# Patient Record
Sex: FEMALE | Race: BLACK OR AFRICAN AMERICAN | Employment: FULL TIME | URBAN - METROPOLITAN AREA
[De-identification: names, ages, dates, MRNs, and addresses within clinical notes are randomized per-mention and may not be internally consistent; named-entity substitution may affect disease eponyms.]

---

## 2017-06-23 ENCOUNTER — OFFICE VISIT (OUTPATIENT)
Dept: FAMILY MEDICINE CLINIC | Facility: CLINIC | Age: 27
End: 2017-06-23

## 2017-06-23 VITALS
HEART RATE: 81 BPM | HEIGHT: 66 IN | RESPIRATION RATE: 18 BRPM | DIASTOLIC BLOOD PRESSURE: 70 MMHG | BODY MASS INDEX: 36.96 KG/M2 | OXYGEN SATURATION: 99 % | TEMPERATURE: 98 F | SYSTOLIC BLOOD PRESSURE: 110 MMHG | WEIGHT: 230 LBS

## 2017-06-23 DIAGNOSIS — R07.89 CHEST TIGHTNESS: Primary | ICD-10-CM

## 2017-06-23 PROCEDURE — 94640 AIRWAY INHALATION TREATMENT: CPT | Performed by: PHYSICIAN ASSISTANT

## 2017-06-23 PROCEDURE — 99202 OFFICE O/P NEW SF 15 MIN: CPT | Performed by: PHYSICIAN ASSISTANT

## 2017-06-23 RX ORDER — ALBUTEROL SULFATE 90 UG/1
2 AEROSOL, METERED RESPIRATORY (INHALATION) EVERY 4 HOURS PRN
Qty: 1 INHALER | Refills: 0 | Status: SHIPPED | OUTPATIENT
Start: 2017-06-23

## 2017-06-23 RX ORDER — IPRATROPIUM BROMIDE AND ALBUTEROL SULFATE 2.5; .5 MG/3ML; MG/3ML
3 SOLUTION RESPIRATORY (INHALATION) ONCE
Status: COMPLETED | OUTPATIENT
Start: 2017-06-23 | End: 2017-06-23

## 2017-06-23 RX ADMIN — IPRATROPIUM BROMIDE AND ALBUTEROL SULFATE 3 ML: 2.5; .5 SOLUTION RESPIRATORY (INHALATION) at 13:04:00

## 2017-06-23 NOTE — PATIENT INSTRUCTIONS
I will call  You with chest x-ray results  Use albuterol inhaler as needed  If symptoms do not improve within 24 hours you will need  To go to the ER for further evaulation    Shortness of Breath (Dyspnea)  Shortness of breath is the feeling that you can't Follow up with your healthcare provider, or as advised. If tests were done, you will be told if your treatment needs to be changed. You can call as directed for the results. (Note: If an X-ray was taken, a specialist will review it.  You will be notified

## 2017-06-23 NOTE — PROGRESS NOTES
CHIEF COMPLAINT:   Patient presents with:  Breathing Problem: this morning, dx with PNA last year. Headache this morning, getting better. No cough.  No ST,     HPI:   Carmen Bautista is a 27year old female who presents with chest tightness and difficulty HENT: Atraumatic, normocephalic. TM's clear bilaterally. Nostrils patent, nasal mucosa pink and non-inflamed. No erythema of the throat. NECK: supple, non-tender. LUNGS: Increased respiratory rate. Normal effort. No cough. No wheezing.  No rales, crac Sig: Inhale 2 puffs into the lungs every 4 (four) hours as needed for Wheezing.    Dispense:  1 Inhaler   Refill:  0    Patient Instructions   I will call  You with chest x-ray results  Use albuterol inhaler as needed  If symptoms do not improve within 24 · Get regular exercise. Talk with your healthcare provider before starting to exercise, especially if you have other medical problems. · Cut down on the amount of caffeine and stimulants you consume.   Follow-up care  Follow up with your healthcare provide

## 2019-07-25 ENCOUNTER — HOSPITAL ENCOUNTER (EMERGENCY)
Facility: HOSPITAL | Age: 29
Discharge: HOME | End: 2019-07-25
Attending: EMERGENCY MEDICINE
Payer: COMMERCIAL

## 2019-07-25 ENCOUNTER — APPOINTMENT (OUTPATIENT)
Dept: GENERAL RADIOLOGY | Facility: HOSPITAL | Age: 29
End: 2019-07-25
Attending: EMERGENCY MEDICINE
Payer: COMMERCIAL

## 2019-07-25 ENCOUNTER — APPOINTMENT (OUTPATIENT)
Dept: CT IMAGING | Facility: HOSPITAL | Age: 29
End: 2019-07-25
Attending: EMERGENCY MEDICINE
Payer: COMMERCIAL

## 2019-07-25 VITALS
OXYGEN SATURATION: 99 % | HEIGHT: 66 IN | WEIGHT: 225 LBS | BODY MASS INDEX: 36.16 KG/M2 | SYSTOLIC BLOOD PRESSURE: 102 MMHG | HEART RATE: 72 BPM | DIASTOLIC BLOOD PRESSURE: 63 MMHG | RESPIRATION RATE: 17 BRPM | TEMPERATURE: 98 F

## 2019-07-25 DIAGNOSIS — R06.00 DYSPNEA, UNSPECIFIED TYPE: Primary | ICD-10-CM

## 2019-07-25 DIAGNOSIS — R07.9 CHEST PAIN OF UNCERTAIN ETIOLOGY: ICD-10-CM

## 2019-07-25 DIAGNOSIS — G47.30 SLEEP APNEA, UNSPECIFIED TYPE: ICD-10-CM

## 2019-07-25 LAB
ANION GAP SERPL CALC-SCNC: 6 MMOL/L (ref 0–18)
B-HCG UR QL: NEGATIVE
BASOPHILS # BLD AUTO: 0.03 X10(3) UL (ref 0–0.2)
BASOPHILS NFR BLD AUTO: 0.4 %
BUN BLD-MCNC: 14 MG/DL (ref 7–18)
BUN/CREAT SERPL: 16.9 (ref 10–20)
CALCIUM BLD-MCNC: 8.7 MG/DL (ref 8.5–10.1)
CHLORIDE SERPL-SCNC: 108 MMOL/L (ref 98–112)
CO2 SERPL-SCNC: 26 MMOL/L (ref 21–32)
CREAT BLD-MCNC: 0.83 MG/DL (ref 0.55–1.02)
D DIMER PPP FEU-MCNC: 0.97 UG/ML FEU (ref ?–0.5)
DEPRECATED RDW RBC AUTO: 46.9 FL (ref 35.1–46.3)
EOSINOPHIL # BLD AUTO: 0.12 X10(3) UL (ref 0–0.7)
EOSINOPHIL NFR BLD AUTO: 1.4 %
ERYTHROCYTE [DISTWIDTH] IN BLOOD BY AUTOMATED COUNT: 15.7 % (ref 11–15)
GLUCOSE BLD-MCNC: 82 MG/DL (ref 70–99)
HCT VFR BLD AUTO: 38.7 % (ref 35–48)
HGB BLD-MCNC: 11.9 G/DL (ref 12–16)
IMM GRANULOCYTES # BLD AUTO: 0.02 X10(3) UL (ref 0–1)
IMM GRANULOCYTES NFR BLD: 0.2 %
LYMPHOCYTES # BLD AUTO: 2.8 X10(3) UL (ref 1–4)
LYMPHOCYTES NFR BLD AUTO: 33.6 %
MCH RBC QN AUTO: 25.3 PG (ref 26–34)
MCHC RBC AUTO-ENTMCNC: 30.7 G/DL (ref 31–37)
MCV RBC AUTO: 82.2 FL (ref 80–100)
MONOCYTES # BLD AUTO: 0.57 X10(3) UL (ref 0.1–1)
MONOCYTES NFR BLD AUTO: 6.8 %
NEUTROPHILS # BLD AUTO: 4.79 X10 (3) UL (ref 1.5–7.7)
NEUTROPHILS # BLD AUTO: 4.79 X10(3) UL (ref 1.5–7.7)
NEUTROPHILS NFR BLD AUTO: 57.6 %
OSMOLALITY SERPL CALC.SUM OF ELEC: 290 MOSM/KG (ref 275–295)
PLATELET # BLD AUTO: 309 10(3)UL (ref 150–450)
RBC # BLD AUTO: 4.71 X10(6)UL (ref 3.8–5.3)
SODIUM SERPL-SCNC: 140 MMOL/L (ref 136–145)
TROPONIN I SERPL-MCNC: <0.045 NG/ML (ref ?–0.04)
WBC # BLD AUTO: 8.3 X10(3) UL (ref 4–11)

## 2019-07-25 PROCEDURE — 93005 ELECTROCARDIOGRAM TRACING: CPT

## 2019-07-25 PROCEDURE — 85025 COMPLETE CBC W/AUTO DIFF WBC: CPT | Performed by: EMERGENCY MEDICINE

## 2019-07-25 PROCEDURE — 93010 ELECTROCARDIOGRAM REPORT: CPT | Performed by: EMERGENCY MEDICINE

## 2019-07-25 PROCEDURE — 71260 CT THORAX DX C+: CPT | Performed by: EMERGENCY MEDICINE

## 2019-07-25 PROCEDURE — 36415 COLL VENOUS BLD VENIPUNCTURE: CPT

## 2019-07-25 PROCEDURE — 94640 AIRWAY INHALATION TREATMENT: CPT

## 2019-07-25 PROCEDURE — 71045 X-RAY EXAM CHEST 1 VIEW: CPT | Performed by: EMERGENCY MEDICINE

## 2019-07-25 PROCEDURE — 85379 FIBRIN DEGRADATION QUANT: CPT | Performed by: EMERGENCY MEDICINE

## 2019-07-25 PROCEDURE — 80048 BASIC METABOLIC PNL TOTAL CA: CPT | Performed by: EMERGENCY MEDICINE

## 2019-07-25 PROCEDURE — 99284 EMERGENCY DEPT VISIT MOD MDM: CPT

## 2019-07-25 PROCEDURE — 81025 URINE PREGNANCY TEST: CPT

## 2019-07-25 PROCEDURE — 84484 ASSAY OF TROPONIN QUANT: CPT | Performed by: EMERGENCY MEDICINE

## 2019-07-25 RX ORDER — IPRATROPIUM BROMIDE AND ALBUTEROL SULFATE 2.5; .5 MG/3ML; MG/3ML
3 SOLUTION RESPIRATORY (INHALATION) ONCE
Status: COMPLETED | OUTPATIENT
Start: 2019-07-25 | End: 2019-07-25

## 2019-07-25 NOTE — ED NOTES
Patient cleared for discharge by MD. Avendanos with patient. Patient discharge instructions reviewed with patient including when and how to follow up  with healthcare provider and when to seek medical treatment. Peripheral IV removed.

## 2019-07-25 NOTE — ED INITIAL ASSESSMENT (HPI)
Pt c/o waking up out of sleep at 0300 this morning with chest pain. +SOB. Denies cough or fever.  States she has a hx of sleep apnea and well as \"restrictive disease\", normally seen and treated out of [facility]

## 2019-07-26 NOTE — ED PROVIDER NOTES
Patient Seen in: [facility] AND [facility] Emergency Department    History   Patient presents with:  Chest Pain Angina (cardiovascular)    Stated Complaint:     HPI    Patient complains of shortness of breath that began this morning.   Reports she felt like she w EXTREMITIES: from, 5/5 strength in all 4 ext,no calf tenderness  NEURO: alert and oiented *3, 2-12 intact, no focal deficit noted  SKIN: good skin turgor, no  rashes  PSYCH: calm, cooperative,    Differential includes: pneumonia vs. CHF vs. bronchitis vs Dictated by (CST): Robina Munoz MD on 7/25/2019 at 12:46     Approved by (CST): Robina Munoz MD on 7/25/2019 at 12:53                Critical Care:        MDM                 Disposition and Plan     Clinical Impression:  Dyspnea, unspecified type  (

## 2021-01-21 ENCOUNTER — APPOINTMENT (OUTPATIENT)
Dept: MRI IMAGING | Facility: HOSPITAL | Age: 31
End: 2021-01-21
Attending: EMERGENCY MEDICINE
Payer: MEDICAID

## 2021-01-21 ENCOUNTER — HOSPITAL ENCOUNTER (EMERGENCY)
Facility: HOSPITAL | Age: 31
Discharge: HOME | End: 2021-01-21
Attending: EMERGENCY MEDICINE
Payer: MEDICAID

## 2021-01-21 VITALS
TEMPERATURE: 98 F | DIASTOLIC BLOOD PRESSURE: 67 MMHG | WEIGHT: 224.88 LBS | OXYGEN SATURATION: 99 % | RESPIRATION RATE: 18 BRPM | SYSTOLIC BLOOD PRESSURE: 113 MMHG | BODY MASS INDEX: 36 KG/M2 | HEART RATE: 89 BPM

## 2021-01-21 DIAGNOSIS — H53.8 BLURRY VISION, RIGHT EYE: Primary | ICD-10-CM

## 2021-01-21 LAB — B-HCG UR QL: NEGATIVE

## 2021-01-21 PROCEDURE — 99284 EMERGENCY DEPT VISIT MOD MDM: CPT

## 2021-01-21 PROCEDURE — 70551 MRI BRAIN STEM W/O DYE: CPT | Performed by: EMERGENCY MEDICINE

## 2021-01-21 PROCEDURE — 81025 URINE PREGNANCY TEST: CPT

## 2021-01-21 NOTE — ED INITIAL ASSESSMENT (HPI)
Pt presents reporting she lost vision to right eye at approx 0200 this morning for about 20 minutes. Pt states vision has returned now. Denies HA, dizziness, weakness.     Pt reports hx allergies and swelling to right eye several days ago

## 2021-01-22 NOTE — ED PROVIDER NOTES
Patient Seen in: [facility] AND [facility] Emergency Department      History   No chief complaint on file.     Stated Complaint: Eye/Visual Problems     HPI/Subjective:   HPI    Patient is a 25-year-old female who presents with right eye visual loss that occurred No acute distress, awake and alert  HEENT: MMM, EOMI, PERRL, visual acuity noted.  Conjunctiva normal  Neck: supple, non tender, no meningeal signs  Extremities: FROM of all extremities, no cyanosis/clubbing/edema  Neuro: CN intact, normal speech, normal ga

## 2021-01-22 NOTE — ED NOTES
Pt A&OX4, pt denies dizziness, lightheadedness, n/v. Discharge paperwork and follow-up discussed with pt, pt verbally understands them. Pt discharged ambulatory with steady gait - no distress noted.

## 2021-01-22 NOTE — ED PROVIDER NOTES
Signout taken with disposition likely discharge pending MRI, seen nonacute as below - stable for discharge as previously anticipated.     Mri Brain Wo Acute (3) Sequence (cpt=70551)    Result Date: 1/21/2021  PROCEDURE: MRI BRAIN WO ACUTE (3) SEQUENCE(CPT=7 process in the diagnostically imaged region by noncontrast MR technique. 2. Lesser incidental findings as above.     Dictated by (CST): Nina Friedman MD on 1/21/2021 at 7:34 PM     Finalized by (CST): Nina Friedamn MD on 1/21/2021 at 7:36 PM

## 2021-01-30 ENCOUNTER — HOSPITAL ENCOUNTER (EMERGENCY)
Facility: HOSPITAL | Age: 31
Discharge: HOME | End: 2021-01-30
Attending: EMERGENCY MEDICINE
Payer: MEDICAID

## 2021-01-30 VITALS
RESPIRATION RATE: 17 BRPM | SYSTOLIC BLOOD PRESSURE: 107 MMHG | DIASTOLIC BLOOD PRESSURE: 73 MMHG | TEMPERATURE: 98 F | HEIGHT: 66 IN | HEART RATE: 74 BPM | BODY MASS INDEX: 37.12 KG/M2 | OXYGEN SATURATION: 100 % | WEIGHT: 231 LBS

## 2021-01-30 DIAGNOSIS — M54.12 CERVICAL RADICULOPATHY: ICD-10-CM

## 2021-01-30 DIAGNOSIS — M54.40 BACK PAIN OF LUMBAR REGION WITH SCIATICA: ICD-10-CM

## 2021-01-30 DIAGNOSIS — G43.001 MIGRAINE WITHOUT AURA AND WITH STATUS MIGRAINOSUS, NOT INTRACTABLE: Primary | ICD-10-CM

## 2021-01-30 LAB — B-HCG UR QL: NEGATIVE

## 2021-01-30 PROCEDURE — 96375 TX/PRO/DX INJ NEW DRUG ADDON: CPT

## 2021-01-30 PROCEDURE — 99284 EMERGENCY DEPT VISIT MOD MDM: CPT

## 2021-01-30 PROCEDURE — 96361 HYDRATE IV INFUSION ADD-ON: CPT

## 2021-01-30 PROCEDURE — 81025 URINE PREGNANCY TEST: CPT

## 2021-01-30 PROCEDURE — 96374 THER/PROPH/DIAG INJ IV PUSH: CPT

## 2021-01-30 RX ORDER — DIPHENHYDRAMINE HYDROCHLORIDE 50 MG/ML
25 INJECTION INTRAMUSCULAR; INTRAVENOUS ONCE
Status: COMPLETED | OUTPATIENT
Start: 2021-01-30 | End: 2021-01-30

## 2021-01-30 RX ORDER — METHYLPREDNISOLONE 4 MG/1
TABLET ORAL
Qty: 1 PACKAGE | Refills: 0 | Status: SHIPPED | OUTPATIENT
Start: 2021-01-30

## 2021-01-30 RX ORDER — METOCLOPRAMIDE HYDROCHLORIDE 5 MG/ML
5 INJECTION INTRAMUSCULAR; INTRAVENOUS ONCE
Status: COMPLETED | OUTPATIENT
Start: 2021-01-30 | End: 2021-01-30

## 2021-01-30 RX ORDER — DEXAMETHASONE SODIUM PHOSPHATE 10 MG/ML
10 INJECTION, SOLUTION INTRAMUSCULAR; INTRAVENOUS ONCE
Status: COMPLETED | OUTPATIENT
Start: 2021-01-30 | End: 2021-01-30

## 2021-01-30 NOTE — ED PROVIDER NOTES
Patient Seen in: [facility] AND [facility] Emergency Department      History   Patient presents with:  Headache  Pain    Stated Complaint: HA, R eye pain, R hand goes numb, pain in back and legs- right side     HPI/Subjective:   HPI    The patient is a 30-year-o (36.7 °C)   Temp src Oral   SpO2 100 %   O2 Device None (Room air)       Current:/73   Pulse 74   Temp 98 °F (36.7 °C) (Oral)   Resp 17   Ht 167.6 cm (5' 6\")   Wt 104.8 kg   LMP 01/10/2021   SpO2 100%   BMI 37.28 kg/m²         Physical Exam  Vitals Behavior: Behavior normal.         Judgment: Judgment normal.       Differential diagnosis includes migraine, sciatica, cervical radiculopathy, intracranial abnormality, subarachnoid        ED Course     Labs Reviewed   [facility] POCT PREGNANCY URINE - Normal

## 2021-01-31 NOTE — ED NOTES
Radha Hue stated IVF is burning her hand. No signs of infiltration noted. No swelling noted around IV site. She states she would like IVF stopped. Fluids disconnected.

## 2021-01-31 NOTE — ED NOTES
At time of discharge, Marilu Bello is alert, oriented, speech clear, respirations regular and nonlabored. She is able to dress himself and ambulates unassisted with steady gait.  She verbalizes understanding of discharge instructions including prescribed medica

## 2021-02-02 ENCOUNTER — TELEPHONE (OUTPATIENT)
Dept: NEUROLOGY | Facility: CLINIC | Age: 31
End: 2021-02-02

## 2021-03-12 DIAGNOSIS — Z23 NEED FOR VACCINATION: ICD-10-CM

## 2021-03-16 ENCOUNTER — IMMUNIZATION (OUTPATIENT)
Dept: LAB | Age: 31
End: 2021-03-16
Attending: HOSPITALIST
Payer: MEDICAID

## 2021-03-16 DIAGNOSIS — Z23 NEED FOR VACCINATION: Primary | ICD-10-CM

## 2021-03-16 PROCEDURE — 0001A SARSCOV2 VAC 30MCG/0.3ML IM: CPT

## 2021-04-06 ENCOUNTER — IMMUNIZATION (OUTPATIENT)
Dept: LAB | Age: 31
End: 2021-04-06
Attending: HOSPITALIST
Payer: MEDICAID

## 2021-04-06 DIAGNOSIS — Z23 NEED FOR VACCINATION: Primary | ICD-10-CM

## 2021-04-06 PROCEDURE — 0002A SARSCOV2 VAC 30MCG/0.3ML IM: CPT
